# Patient Record
Sex: MALE | Race: WHITE | NOT HISPANIC OR LATINO | URBAN - METROPOLITAN AREA
[De-identification: names, ages, dates, MRNs, and addresses within clinical notes are randomized per-mention and may not be internally consistent; named-entity substitution may affect disease eponyms.]

---

## 2020-12-03 ENCOUNTER — VIRTUAL VISIT (OUTPATIENT)
Dept: SLEEP MEDICINE | Facility: CLINIC | Age: 35
End: 2020-12-03
Payer: COMMERCIAL

## 2020-12-03 VITALS — WEIGHT: 160 LBS | BODY MASS INDEX: 22.9 KG/M2 | HEIGHT: 70 IN

## 2020-12-03 DIAGNOSIS — F41.9 ANXIETY: Primary | ICD-10-CM

## 2020-12-03 DIAGNOSIS — F51.04 PSYCHOPHYSIOLOGICAL INSOMNIA: ICD-10-CM

## 2020-12-03 PROCEDURE — 99204 OFFICE O/P NEW MOD 45 MIN: CPT | Mod: 95 | Performed by: FAMILY MEDICINE

## 2020-12-03 RX ORDER — CLOBETASOL PROPIONATE 0.5 MG/G
CREAM TOPICAL
COMMUNITY
Start: 2020-07-26

## 2020-12-03 RX ORDER — ESCITALOPRAM OXALATE 10 MG/1
TABLET ORAL
COMMUNITY
Start: 2020-11-25

## 2020-12-03 RX ORDER — BUPROPION HYDROCHLORIDE 300 MG/1
TABLET ORAL
COMMUNITY
Start: 2020-11-25

## 2020-12-03 ASSESSMENT — MIFFLIN-ST. JEOR: SCORE: 1654.07

## 2020-12-03 NOTE — PROGRESS NOTES
"Kwame Stanton is a 35 year old male who is being evaluated via a billable video visit.      The patient has been notified of following:     \"This video visit will be conducted via a call between you and your physician/provider. We have found that certain health care needs can be provided without the need for an in-person physical exam.  This service lets us provide the care you need with a video conversation.  If a prescription is necessary we can send it directly to your pharmacy.  If lab work is needed we can place an order for that and you can then stop by our lab to have the test done at a later time.    Video visits are billed at different rates depending on your insurance coverage.  Please reach out to your insurance provider with any questions.    If during the course of the call the physician/provider feels a video visit is not appropriate, you will not be charged for this service.\"    Patient has given verbal consent for Video visit? Yes  How would you like to obtain your AVS? Mail a copy  If you are dropped from the video visit, the video invite should be resent to:   Will anyone else be joining your video visit? No      Video-Visit Details    Type of service:  Video Visit    Video Start Time: 2:32 pm  Video End Time: 3:11 pm    Originating Location (pt. Location): Home    Distant Location (provider location):  [facility]     Platform used for Video Visit: Yas Au  Denys Stanton is a 35 year old male with a history of depression and anxiety who presents with difficulty falling and staying asleep. These symptoms are chronic but he feels they have worsened. 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 Sometimes it can take him up to 30-60 minutes to fall asleep, but he is most concerned that he wakes up 5-6 times per night. He stays in bed to try to fall back asleep, otherwise he will read. He feels best " when he has morning shifts and worst when his shifts end at 1-3 am. When he sleeps away from home, he feels it is more difficult to fall and stay asleep. He practices good sleep hygeine with no TV or computer in bed and he has a cold, dark room. He has tried 10 mg melatonin, but does not feel it helps much. He has not tried prescription sleep aids. Though he is not falling asleep at work, he feels his lack of sleep affects his functioning in areas of cognition and mood. He notes nodding off if reading quietly and can struggle to stay awake driving. He also notes increased stress due to COVID, as well as relationship issues. He does not report restless legs or other movements in bed. No snoring or sleep apnea. He has been on Wellbutrin for years and started escitalopram a week ago. He takes both upon wakening. He has never been treated inpatient or intensive outpatient for mental health. He rarely drinks alcohol. Does not drink caffeine within 8 hours of sleep. Not a smoker.      Physical Exam  Constitutional:      General: She is not in acute distress.     Appearance: Normal appearance. She is not ill-appearing, toxic-appearing or diaphoretic.  HENT:     Head: Normocephalic and atraumatic.     Right Ear: External ear normal.     Left Ear: External ear normal.     Nose: Nose normal.  Eyes:     Conjunctiva/sclera: Conjunctivae normal.  Pulmonary:     Effort: Pulmonary effort is normal. No respiratory distress.  Skin:     Coloration: Skin is not jaundiced or pale.     Findings: No bruising or erythema.  Neurological:     General: No focal deficit present.     Mental Status: She is alert and oriented to person, place, and time.  Psychiatric:         Mood and Affect: Mildly flat affect.       Behavior: Behavior normal.        Thought Content: Thought content normal.        Judgment: Judgment normal.    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 Sleep disturbances are common in shift workers and this seems to play a primary role in his insomnia. He is also experiencing additional life stressors and I suspect there is a psychological component as well. We discussed the importance of behavior changes including 1) sleep hygiene, using the bed only for sleep and sex, 2) stimulus control, encouraging him to not look at the clock when he wakes up at night, and 3) sleep restriction, which is not compatible with his work schedule. We also discussed medication options including both sleep and wakefulness promoters. Among the sleep promoters, we discussed TCAs/trazadone, Zolpidem/Lunesta, and suvorexant.  - He would like to try the behavioral modifications first and I encouraged him to do so while still taking melatonin 10 mg, 30 minutes before bedtime.  - With his shift work, it would be reasonable to use a medication for his symptoms. Zolpidem or suvorexant might be good options because of their short half-life and less sedation respectively. Will consider if he does not notice improvements with behavioral modifications.  - Follow-up in 2 weeks via MyChart or phone.    Scribe Disclosure:   IAlissa, [email or web address], am serving as a scribe; to document services personally performed by Dr. Prosper Nicole, based on data collection and the provider's statements to me.     Provider Disclosure:  IProsper, agree with above History, Review of Systems, Physical exam and Plan.  I have reviewed the content of the documentation and have edited it as needed. I have personally performed the services documented here and the documentation accurately represents those services and the decisions I have made.      I have spent 45 minutes with this patient today in which greater than 50% of this time was spent in the counseling /  coordination of care.      Prosper Nicole MD

## 2020-12-03 NOTE — PATIENT INSTRUCTIONS
Your BMI is Body mass index is 23.29 kg/m .  Weight management is a personal decision.  If you are interested in exploring weight loss strategies, the following discussion covers the approaches that may be successful. Body mass index (BMI) is one way to tell whether you are at a healthy weight, overweight, or obese. It measures your weight in relation to your height.  A BMI of 18.5 to 24.9 is in the healthy range. A person with a BMI of 25 to 29.9 is considered overweight, and someone with a BMI of 30 or greater is considered obese. More than two-thirds of American adults are considered overweight or obese.  Being overweight or obese increases the risk for further weight gain. Excess weight may lead to heart disease and diabetes.  Creating and following plans for healthy eating and physical activity may help you improve your health.  Weight control is part of healthy lifestyle and includes exercise, emotional health, and healthy eating habits. Careful eating habits lifelong are the mainstay of weight control. Though there are significant health benefits from weight loss, long-term weight loss with diet alone may be very difficult to achieve- studies show long-term success with dietary management in less than 10% of people. Attaining a healthy weight may be especially difficult to achieve in those with severe obesity. In some cases, medications, devices and surgical management might be considered.  What can you do?  If you are overweight or obese and are interested in methods for weight loss, you should discuss this with your provider.     Consider reducing daily calorie intake by 500 calories.     Keep a food journal.     Avoiding skipping meals, consider cutting portions instead.    Diet combined with exercise helps maintain muscle while optimizing fat loss. Strength training is particularly important for building and maintaining muscle mass. Exercise helps reduce stress, increase energy, and improves fitness.  Increasing exercise without diet control, however, may not burn enough calories to loose weight.       Start walking three days a week 10-20 minutes at a time    Work towards walking thirty minutes five days a week     Eventually, increase the speed of your walking for 1-2 minutes at time    In addition, we recommend that you review healthy lifestyles and methods for weight loss available through the National [employer] of [employer] patient information sites:  http://win.niddk.nih.gov/publications/index.htm    And look into health and wellness programs that may be available through your health insurance provider, employer, local community center, or celine club.

## 2020-12-03 NOTE — PROGRESS NOTES
"Kwame Stanton is a 35 year old male who is being evaluated via a billable video visit.      The patient has been notified of following:     \"This video visit will be conducted via a call between you and your physician/provider. We have found that certain health care needs can be provided without the need for an in-person physical exam.  This service lets us provide the care you need with a video conversation.  If a prescription is necessary we can send it directly to your pharmacy.  If lab work is needed we can place an order for that and you can then stop by our lab to have the test done at a later time.    Video visits are billed at different rates depending on your insurance coverage.  Please reach out to your insurance provider with any questions.    If during the course of the call the physician/provider feels a video visit is not appropriate, you will not be charged for this service.\"    Patient has given verbal consent for Video visit? Yes  How would you like to obtain your AVS? Mail a copy  If you are dropped from the video visit, the video invite should be resent to: Text to cell phone: [phone]  Will anyone else be joining your video visit? No  {If patient encounters technical issues they should call [phone] :[id number]}      Video-Visit Details    Type of service:  Video Visit    Video Start Time: {video visit start/end time:152948}  Video End Time: {video visit start/end time:152948}    Originating Location (pt. Location): {video visit patient location:827212::\"Home\"}    Distant Location (provider location):  [facility]     Platform used for Video Visit: {Virtual Visit Platforms:834889::\"TORIA\"}    {signature options:510769}        "